# Patient Record
Sex: MALE | Race: WHITE
[De-identification: names, ages, dates, MRNs, and addresses within clinical notes are randomized per-mention and may not be internally consistent; named-entity substitution may affect disease eponyms.]

---

## 2017-07-19 ENCOUNTER — APPOINTMENT (OUTPATIENT)
Dept: PULMONOLOGY | Facility: CLINIC | Age: 57
End: 2017-07-19

## 2017-07-19 VITALS
HEIGHT: 70 IN | DIASTOLIC BLOOD PRESSURE: 70 MMHG | OXYGEN SATURATION: 95 % | WEIGHT: 180 LBS | SYSTOLIC BLOOD PRESSURE: 110 MMHG | TEMPERATURE: 97.8 F | HEART RATE: 71 BPM | BODY MASS INDEX: 25.77 KG/M2

## 2018-07-18 ENCOUNTER — APPOINTMENT (OUTPATIENT)
Dept: PULMONOLOGY | Facility: CLINIC | Age: 58
End: 2018-07-18
Payer: COMMERCIAL

## 2018-07-18 VITALS
HEIGHT: 70 IN | OXYGEN SATURATION: 95 % | HEART RATE: 71 BPM | SYSTOLIC BLOOD PRESSURE: 120 MMHG | WEIGHT: 175 LBS | BODY MASS INDEX: 25.05 KG/M2 | DIASTOLIC BLOOD PRESSURE: 72 MMHG | TEMPERATURE: 97.9 F

## 2018-07-18 PROCEDURE — 71046 X-RAY EXAM CHEST 2 VIEWS: CPT

## 2018-07-18 PROCEDURE — 94010 BREATHING CAPACITY TEST: CPT

## 2018-07-18 PROCEDURE — 99214 OFFICE O/P EST MOD 30 MIN: CPT | Mod: 25

## 2019-03-15 ENCOUNTER — TRANSCRIPTION ENCOUNTER (OUTPATIENT)
Age: 59
End: 2019-03-15

## 2019-06-04 ENCOUNTER — APPOINTMENT (OUTPATIENT)
Dept: PULMONOLOGY | Facility: CLINIC | Age: 59
End: 2019-06-04
Payer: COMMERCIAL

## 2019-06-04 VITALS
TEMPERATURE: 98.9 F | HEIGHT: 70 IN | SYSTOLIC BLOOD PRESSURE: 112 MMHG | DIASTOLIC BLOOD PRESSURE: 70 MMHG | HEART RATE: 78 BPM | OXYGEN SATURATION: 94 %

## 2019-06-04 PROCEDURE — 99214 OFFICE O/P EST MOD 30 MIN: CPT | Mod: 25

## 2019-06-04 PROCEDURE — 94010 BREATHING CAPACITY TEST: CPT

## 2019-06-04 PROCEDURE — 71046 X-RAY EXAM CHEST 2 VIEWS: CPT

## 2019-06-04 RX ORDER — PROMETHAZINE HYDROCHLORIDE AND CODEINE PHOSPHATE 6.25; 1 MG/5ML; MG/5ML
6.25-1 SOLUTION ORAL
Qty: 200 | Refills: 0 | Status: ACTIVE | COMMUNITY
Start: 2019-06-04 | End: 1900-01-01

## 2019-06-05 NOTE — HISTORY OF PRESENT ILLNESS
[FreeTextEntry1] : patient with a history of . otherwise quite well, and has residual inflammatory findings on the chest film for which he comes by for a yearly follow up  he is doing well, aside form mild internmittent coughig  that may be realted to allergies or possible mild viral infection.

## 2019-06-05 NOTE — PHYSICAL EXAM
[General Appearance - Well Developed] : well developed [Normal Appearance] : normal appearance [General Appearance - Well Nourished] : well nourished [Well Groomed] : well groomed [No Deformities] : no deformities [General Appearance - In No Acute Distress] : no acute distress [Normal Conjunctiva] : the conjunctiva exhibited no abnormalities [Eyelids - No Xanthelasma] : the eyelids demonstrated no xanthelasmas [Normal Oropharynx] : normal oropharynx [Neck Cervical Mass (___cm)] : no neck mass was observed [Neck Appearance] : the appearance of the neck was normal [Jugular Venous Distention Increased] : there was no jugular-venous distention [Thyroid Diffuse Enlargement] : the thyroid was not enlarged [Thyroid Nodule] : there were no palpable thyroid nodules [Heart Rate And Rhythm] : heart rate and rhythm were normal [Murmurs] : no murmurs present [Heart Sounds] : normal S1 and S2 [FreeTextEntry1] : ronchi in the left lung in areas of bronchiectasis [Abdomen Soft] : soft [Abdomen Tenderness] : non-tender [Abdomen Mass (___ Cm)] : no abdominal mass palpated [Abnormal Walk] : normal gait [Gait - Sufficient For Exercise Testing] : the gait was sufficient for exercise testing [Nail Clubbing] : no clubbing of the fingernails [Cyanosis, Localized] : no localized cyanosis [Petechial Hemorrhages (___cm)] : no petechial hemorrhages [] : no ischemic changes [Deep Tendon Reflexes (DTR)] : deep tendon reflexes were 2+ and symmetric [Sensation] : the sensory exam was normal to light touch and pinprick [Oriented To Time, Place, And Person] : oriented to person, place, and time [No Focal Deficits] : no focal deficits [Impaired Insight] : insight and judgment were intact [Affect] : the affect was normal

## 2019-06-05 NOTE — PROCEDURE
[FreeTextEntry1] : spirometry is normal\par \par chest film with areas of inflammatory residual without change from the past

## 2019-06-05 NOTE — DISCUSSION/SUMMARY
[FreeTextEntry1] : no evidence for recurrent \par \par yearly follow up is all that is needed.\par \par can try inhaled steroid for cough if it continues.

## 2019-09-06 ENCOUNTER — APPOINTMENT (OUTPATIENT)
Dept: PULMONOLOGY | Facility: CLINIC | Age: 59
End: 2019-09-06
Payer: COMMERCIAL

## 2019-09-06 VITALS
TEMPERATURE: 97 F | DIASTOLIC BLOOD PRESSURE: 70 MMHG | HEIGHT: 70 IN | BODY MASS INDEX: 25.05 KG/M2 | WEIGHT: 175 LBS | OXYGEN SATURATION: 98 % | SYSTOLIC BLOOD PRESSURE: 110 MMHG | HEART RATE: 77 BPM

## 2019-09-06 DIAGNOSIS — J84.116 CRYPTOGENIC ORGANIZING PNEUMONIA: ICD-10-CM

## 2019-09-06 PROCEDURE — 94010 BREATHING CAPACITY TEST: CPT

## 2019-09-06 PROCEDURE — 99214 OFFICE O/P EST MOD 30 MIN: CPT | Mod: 25

## 2019-09-06 PROCEDURE — 71046 X-RAY EXAM CHEST 2 VIEWS: CPT

## 2019-09-07 NOTE — PROCEDURE
[FreeTextEntry1] : spirometry with slight restriction\par \par chest film unchanged inflamatory changes at both bases

## 2019-09-07 NOTE — REASON FOR VISIT
[Follow-Up] : a follow-up visit [FreeTextEntry1] : patient with history of  who has had couging problems ever since he was treated, likely due to residual scarring in his lungs,  now with clear cough that has worsened

## 2019-09-07 NOTE — HISTORY OF PRESENT ILLNESS
[FreeTextEntry1] : healthy man with a chronic cough, left over as a result of his dx years ago with .  sputum is clear, occ discolored no fever or dyspnea

## 2019-09-07 NOTE — PHYSICAL EXAM
[General Appearance - Well Developed] : well developed [Normal Appearance] : normal appearance [Well Groomed] : well groomed [General Appearance - Well Nourished] : well nourished [No Deformities] : no deformities [General Appearance - In No Acute Distress] : no acute distress [Normal Conjunctiva] : the conjunctiva exhibited no abnormalities [Eyelids - No Xanthelasma] : the eyelids demonstrated no xanthelasmas [Normal Oropharynx] : normal oropharynx [Neck Appearance] : the appearance of the neck was normal [Neck Cervical Mass (___cm)] : no neck mass was observed [Jugular Venous Distention Increased] : there was no jugular-venous distention [Thyroid Nodule] : there were no palpable thyroid nodules [Thyroid Diffuse Enlargement] : the thyroid was not enlarged [Heart Rate And Rhythm] : heart rate and rhythm were normal [Heart Sounds] : normal S1 and S2 [Murmurs] : no murmurs present [FreeTextEntry1] : ronchi in the left lung in areas of bronchiectasis [Abdomen Soft] : soft [Abdomen Tenderness] : non-tender [Abdomen Mass (___ Cm)] : no abdominal mass palpated [Abnormal Walk] : normal gait [Gait - Sufficient For Exercise Testing] : the gait was sufficient for exercise testing [Nail Clubbing] : no clubbing of the fingernails [Cyanosis, Localized] : no localized cyanosis [Petechial Hemorrhages (___cm)] : no petechial hemorrhages [] : no ischemic changes [Deep Tendon Reflexes (DTR)] : deep tendon reflexes were 2+ and symmetric [Sensation] : the sensory exam was normal to light touch and pinprick [No Focal Deficits] : no focal deficits [Oriented To Time, Place, And Person] : oriented to person, place, and time [Impaired Insight] : insight and judgment were intact [Affect] : the affect was normal

## 2019-09-07 NOTE — DISCUSSION/SUMMARY
[FreeTextEntry1] : i will get a ct, but likely will need to treat this with pulmonary hygiene mostly.  will need to get ct and see whats there first.  discuss with patient and his concerned wife it detail, with review of present and previous films.

## 2019-09-07 NOTE — REVIEW OF SYSTEMS
[Cough] : cough [Sputum] : sputum  [Dyspnea] : no dyspnea [Chest Tightness] : no chest tightness [Pleuritic Pain] : no pleuritic pain [Negative] : Dermatologic

## 2019-09-11 ENCOUNTER — OTHER (OUTPATIENT)
Age: 59
End: 2019-09-11

## 2019-09-17 LAB — BACTERIA SPT CULT: ABNORMAL

## 2019-10-01 RX ORDER — AMOXICILLIN 500 MG/1
500 TABLET, FILM COATED ORAL 3 TIMES DAILY
Qty: 42 | Refills: 0 | Status: ACTIVE | COMMUNITY
Start: 2019-10-01 | End: 1900-01-01

## 2019-11-04 LAB — ACID FAST STN SPT: NORMAL

## 2019-11-22 LAB — BACTERIA SPT CULT: NORMAL

## 2020-03-14 RX ORDER — HYDROCODONE BITARTRATE AND HOMATROPINE METHYLBROMIDE 5; 1.5 MG/5ML; MG/5ML
5-1.5 SOLUTION ORAL
Qty: 150 | Refills: 0 | Status: ACTIVE | COMMUNITY
Start: 2018-07-18 | End: 1900-01-01

## 2020-07-15 ENCOUNTER — LABORATORY RESULT (OUTPATIENT)
Age: 60
End: 2020-07-15

## 2020-07-17 ENCOUNTER — APPOINTMENT (OUTPATIENT)
Dept: PULMONOLOGY | Facility: CLINIC | Age: 60
End: 2020-07-17
Payer: COMMERCIAL

## 2020-07-17 VITALS
WEIGHT: 184 LBS | HEIGHT: 70 IN | TEMPERATURE: 97.6 F | BODY MASS INDEX: 26.34 KG/M2 | HEART RATE: 62 BPM | DIASTOLIC BLOOD PRESSURE: 65 MMHG | SYSTOLIC BLOOD PRESSURE: 110 MMHG | OXYGEN SATURATION: 96 %

## 2020-07-17 PROCEDURE — 99213 OFFICE O/P EST LOW 20 MIN: CPT

## 2020-07-17 PROCEDURE — 71046 X-RAY EXAM CHEST 2 VIEWS: CPT

## 2020-07-19 NOTE — PROCEDURE
[FreeTextEntry1] : chest film with inflamamtory changes  no other findings in comparison to old films

## 2020-07-19 NOTE — REVIEW OF SYSTEMS
[Dry Eyes] : dry eyes [Eye Irritation] : eye irritation [Sinus Problems] : sinus problems [Cough] : cough [TextBox_30] : scant sputum [Negative] : Musculoskeletal

## 2020-07-19 NOTE — HISTORY OF PRESENT ILLNESS
[TextBox_4] : patient with bronchiectasis,  many years ago suffered from , and was left with a chronic cough  last year had short period of hemoptysis and had a ct that showed no significant pathology other than what we already knew.  he feels well, his cough is chronic and does not bother him.

## 2020-07-19 NOTE — REASON FOR VISIT
[Follow-Up] : a follow-up visit [TextBox_44] : patient with bronchiectasis as a result of  many years ago, here for yearly visit

## 2020-07-19 NOTE — PHYSICAL EXAM
[No Acute Distress] : no acute distress [Normal Oropharynx] : normal oropharynx [No Neck Mass] : no neck mass [Normal Rate/Rhythm] : normal rate/rhythm [Normal Appearance] : normal appearance [Normal S1, S2] : normal s1, s2 [Clear to Auscultation Bilaterally] : clear to auscultation bilaterally [No Resp Distress] : no resp distress [No Murmurs] : no murmurs [No Abnormalities] : no abnormalities [No Clubbing] : no clubbing [Normal Color/ Pigmentation] : normal color/ pigmentation [No Focal Deficits] : no focal deficits [Oriented x3] : oriented x3 [Normal Affect] : normal affect

## 2020-07-24 ENCOUNTER — TRANSCRIPTION ENCOUNTER (OUTPATIENT)
Age: 60
End: 2020-07-24

## 2020-10-22 LAB
SARS-COV-2 IGG SERPL IA-ACNC: 0.08 INDEX
SARS-COV-2 IGG SERPL QL IA: NEGATIVE

## 2020-11-20 ENCOUNTER — TRANSCRIPTION ENCOUNTER (OUTPATIENT)
Age: 60
End: 2020-11-20

## 2020-12-04 ENCOUNTER — TRANSCRIPTION ENCOUNTER (OUTPATIENT)
Age: 60
End: 2020-12-04

## 2021-06-14 ENCOUNTER — APPOINTMENT (OUTPATIENT)
Dept: PULMONOLOGY | Facility: CLINIC | Age: 61
End: 2021-06-14
Payer: COMMERCIAL

## 2021-06-14 VITALS
TEMPERATURE: 97.1 F | HEART RATE: 73 BPM | BODY MASS INDEX: 26.05 KG/M2 | OXYGEN SATURATION: 95 % | HEIGHT: 70 IN | DIASTOLIC BLOOD PRESSURE: 72 MMHG | WEIGHT: 182 LBS | SYSTOLIC BLOOD PRESSURE: 121 MMHG

## 2021-06-14 PROCEDURE — 99072 ADDL SUPL MATRL&STAF TM PHE: CPT

## 2021-06-14 PROCEDURE — 71046 X-RAY EXAM CHEST 2 VIEWS: CPT

## 2021-06-14 PROCEDURE — 99213 OFFICE O/P EST LOW 20 MIN: CPT | Mod: 25

## 2021-06-15 NOTE — PROCEDURE
[FreeTextEntry1] : X-ray chest 2- view performed in-office :\par \par Unchanged from prior. No focal opacities. \par \par Spirometry:\par Normal spirometry , no obstruction or restriction.

## 2021-06-15 NOTE — HISTORY OF PRESENT ILLNESS
[TextBox_4] : 60 year old male with pmh of  with residual bronchiectasis who is here for follow up. Patient continues to have some cough throughout the day. Cough is worse in the morning and is productive of sputum. He does not feel that the cough is bothersome or disruptive to his daily life. Denies any fevers or chills. Has been attending airway clearance teaching sessions with his wife and is interested in trying an airway clearance device.

## 2021-06-15 NOTE — REVIEW OF SYSTEMS
[Cough] : cough [Sputum] : sputum [Negative] : Endocrine [Hemoptysis] : no hemoptysis [Chest Tightness] : no chest tightness [Frequent URIs] : no frequent URIs [Dyspnea] : no dyspnea [Pleuritic Pain] : no pleuritic pain [Wheezing] : no wheezing [A.M. Dry Mouth] : no a.m. dry mouth [SOB on Exertion] : no sob on exertion

## 2021-06-15 NOTE — DISCUSSION/SUMMARY
[FreeTextEntry1] : Patient with stable bronchiectasis. Has not had any significant changes in his X-ray , spirometry or clinical symptoms. \par \par Interested in trying airway clearance , Recommended aerobika. \par \par RTC in 1 year.

## 2022-03-14 ENCOUNTER — APPOINTMENT (OUTPATIENT)
Dept: PULMONOLOGY | Facility: CLINIC | Age: 62
End: 2022-03-14
Payer: COMMERCIAL

## 2022-03-14 VITALS
OXYGEN SATURATION: 96 % | HEIGHT: 70 IN | HEART RATE: 64 BPM | TEMPERATURE: 97.1 F | BODY MASS INDEX: 26.05 KG/M2 | SYSTOLIC BLOOD PRESSURE: 121 MMHG | WEIGHT: 182 LBS | DIASTOLIC BLOOD PRESSURE: 78 MMHG

## 2022-03-14 PROCEDURE — 71046 X-RAY EXAM CHEST 2 VIEWS: CPT

## 2022-03-14 RX ORDER — PREDNISONE 20 MG/1
20 TABLET ORAL
Qty: 10 | Refills: 3 | Status: ACTIVE | COMMUNITY
Start: 2022-03-14 | End: 1900-01-01

## 2022-03-14 RX ORDER — PROMETHAZINE HYDROCHLORIDE AND CODEINE PHOSPHATE 6.25; 1 MG/5ML; MG/5ML
6.25-1 SOLUTION ORAL
Qty: 200 | Refills: 3 | Status: ACTIVE | COMMUNITY
Start: 2022-03-14 | End: 1900-01-01

## 2022-03-15 NOTE — PHYSICAL EXAM
[No Acute Distress] : no acute distress [Normal Oropharynx] : normal oropharynx [Normal Appearance] : normal appearance [No Neck Mass] : no neck mass [Normal Rate/Rhythm] : normal rate/rhythm [Normal S1, S2] : normal s1, s2 [No Murmurs] : no murmurs [No Resp Distress] : no resp distress [No Abnormalities] : no abnormalities [No Clubbing] : no clubbing [Normal Color/ Pigmentation] : normal color/ pigmentation [No Focal Deficits] : no focal deficits [Oriented x3] : oriented x3 [Normal Affect] : normal affect [TextBox_68] : harsh ronchi that clears with coughing

## 2022-03-15 NOTE — HISTORY OF PRESENT ILLNESS
[TextBox_4] : patient with bronchiecatiss as a result of  many year ago,\par \par here for fu that he comes for every year.\par \par doing well, but notes couging and briinging up more sputum

## 2022-03-15 NOTE — DISCUSSION/SUMMARY
[FreeTextEntry1] : place on augmentin and steroids\par \par for exacerbation of bronchiectasis left over from  he suffered from in the past

## 2022-03-15 NOTE — REVIEW OF SYSTEMS
[Dry Eyes] : dry eyes [Eye Irritation] : eye irritation [Sinus Problems] : sinus problems [Cough] : cough [Negative] : Dermatologic [TextBox_30] : scant sputum

## 2022-05-10 ENCOUNTER — NON-APPOINTMENT (OUTPATIENT)
Age: 62
End: 2022-05-10

## 2022-05-13 ENCOUNTER — NON-APPOINTMENT (OUTPATIENT)
Age: 62
End: 2022-05-13

## 2022-05-18 RX ORDER — AMOXICILLIN AND CLAVULANATE POTASSIUM 875; 125 MG/1; MG/1
875-125 TABLET, COATED ORAL
Qty: 20 | Refills: 1 | Status: ACTIVE | COMMUNITY
Start: 2022-03-14 | End: 1900-01-01

## 2022-08-08 ENCOUNTER — NON-APPOINTMENT (OUTPATIENT)
Age: 62
End: 2022-08-08

## 2023-03-14 NOTE — REASON FOR VISIT
[Follow-Up] : a follow-up visit [Bronchiectasis] : bronchiectasis Skyri[facility] Counseling: I discussed with the patient the risks of [id number]-rzaa including but not limited to immunosuppression, and serious infections.  The patient understands that monitoring is required including a PPD at baseline and must alert us or the primary physician if symptoms of infection or other concerning signs are noted.

## 2023-06-16 ENCOUNTER — APPOINTMENT (OUTPATIENT)
Dept: PULMONOLOGY | Facility: CLINIC | Age: 63
End: 2023-06-16
Payer: COMMERCIAL

## 2023-06-16 VITALS
HEIGHT: 70 IN | TEMPERATURE: 97.1 F | SYSTOLIC BLOOD PRESSURE: 121 MMHG | WEIGHT: 190 LBS | DIASTOLIC BLOOD PRESSURE: 76 MMHG | HEART RATE: 68 BPM | OXYGEN SATURATION: 97 % | BODY MASS INDEX: 27.2 KG/M2

## 2023-06-16 PROCEDURE — 71046 X-RAY EXAM CHEST 2 VIEWS: CPT

## 2023-06-16 PROCEDURE — 99214 OFFICE O/P EST MOD 30 MIN: CPT | Mod: 25

## 2023-06-18 NOTE — PROCEDURE
[FreeTextEntry1] : chest film without any change from before\par \par evidence of bronchiectiass in the right lung field at the bases

## 2023-06-18 NOTE — PHYSICAL EXAM
[No Acute Distress] : no acute distress [Normal Oropharynx] : normal oropharynx [Normal Appearance] : normal appearance [Normal Rate/Rhythm] : normal rate/rhythm [No Neck Mass] : no neck mass [Normal S1, S2] : normal s1, s2 [No Murmurs] : no murmurs [No Resp Distress] : no resp distress [No Abnormalities] : no abnormalities [Normal Color/ Pigmentation] : normal color/ pigmentation [No Clubbing] : no clubbing [No Focal Deficits] : no focal deficits [Oriented x3] : oriented x3 [Normal Affect] : normal affect [TextBox_68] : harsh ronchi that clears with coughing

## 2023-06-18 NOTE — HISTORY OF PRESENT ILLNESS
[TextBox_4] : here with bronchictasis as a remnant of \par \par and still occ cough, cvongestion allergies\par \par but usually does well\par \par low pitched rumble in lungs\par \par not change from before\par \par otherewise is enjoying good health\par \par was on copious steroids 18 years ago for this disagnosis of  and he's left with residual scarring.\par \par

## 2023-09-22 ENCOUNTER — NON-APPOINTMENT (OUTPATIENT)
Age: 63
End: 2023-09-22

## 2023-10-23 ENCOUNTER — APPOINTMENT (OUTPATIENT)
Dept: PULMONOLOGY | Facility: CLINIC | Age: 63
End: 2023-10-23

## 2024-01-07 LAB
BACTERIA SPT CULT: NORMAL
BACTERIA SPT CULT: NORMAL

## 2024-06-09 ENCOUNTER — NON-APPOINTMENT (OUTPATIENT)
Age: 64
End: 2024-06-09

## 2024-06-10 ENCOUNTER — APPOINTMENT (OUTPATIENT)
Dept: PULMONOLOGY | Facility: CLINIC | Age: 64
End: 2024-06-10
Payer: MEDICARE

## 2024-06-10 VITALS
DIASTOLIC BLOOD PRESSURE: 70 MMHG | SYSTOLIC BLOOD PRESSURE: 120 MMHG | WEIGHT: 189 LBS | OXYGEN SATURATION: 96 % | HEART RATE: 75 BPM | TEMPERATURE: 98.4 F | HEIGHT: 70 IN | RESPIRATION RATE: 16 BRPM | BODY MASS INDEX: 27.06 KG/M2

## 2024-06-10 DIAGNOSIS — J47.9 BRONCHIECTASIS, UNCOMPLICATED: ICD-10-CM

## 2024-06-10 PROCEDURE — 99204 OFFICE O/P NEW MOD 45 MIN: CPT | Mod: 25

## 2024-06-10 PROCEDURE — 89220 SPUTUM SPECIMEN COLLECTION: CPT

## 2024-06-10 RX ORDER — AMOXICILLIN AND CLAVULANATE POTASSIUM 875; 125 MG/1; MG/1
875-125 TABLET, COATED ORAL TWICE DAILY
Qty: 20 | Refills: 0 | Status: ACTIVE | COMMUNITY
Start: 2024-06-10 | End: 1900-01-01

## 2024-06-10 NOTE — HISTORY OF PRESENT ILLNESS
[Never] : never [TextBox_4] : Mr. Carr presents to clinic today for follow up of bronchiectasis. He developed a bad cough over the weekend it started saturday night. He had a runny nose during the day and some muscle aches. Hasn't checked his temperature. Before this bout he was doing great and had some cough here and there.

## 2024-06-10 NOTE — PHYSICAL EXAM
[No Acute Distress] : no acute distress [Normal Oropharynx] : normal oropharynx [Normal Appearance] : normal appearance [No Neck Mass] : no neck mass [Normal Rate/Rhythm] : normal rate/rhythm [Normal S1, S2] : normal s1, s2 [No Murmurs] : no murmurs [No Resp Distress] : no resp distress [No Abnormalities] : no abnormalities [Benign] : benign [Normal Gait] : normal gait [No Clubbing] : no clubbing [No Cyanosis] : no cyanosis [No Edema] : no edema [FROM] : FROM [Normal Color/ Pigmentation] : normal color/ pigmentation [No Focal Deficits] : no focal deficits [Oriented x3] : oriented x3 [Normal Affect] : normal affect [TextBox_68] : some faint crackles bilaterally at the bases

## 2024-06-10 NOTE — ASSESSMENT
[FreeTextEntry1] : He is overall doing well, getting over an URI. Got a sputum culture in the office today. He has grown normal amelia the last several years. CXR also done in the office which looks normal with some areas of known bronchiectasis. Will prescribe a course of Augmentin. If his symptoms do not improve in that time or if the culture grows a bug that's not covered, we can change his regimen and possibly give steroids for his wheezing if it does not improve. Cough syrup also prescribed as he has trouble sleeping because of the cough.   RTC with Dr. Nguyen in 6 months

## 2024-06-14 RX ORDER — HYDROCODONE BITARTRATE AND HOMATROPINE METHYLBROMIDE 1.5; 5 MG/5ML; MG/5ML
5-1.5 SOLUTION ORAL
Qty: 200 | Refills: 0 | Status: ACTIVE | COMMUNITY
Start: 2024-06-10 | End: 1900-01-01

## 2024-06-19 LAB — BACTERIA SPT CULT: ABNORMAL

## 2025-02-04 ENCOUNTER — APPOINTMENT (OUTPATIENT)
Dept: PULMONOLOGY | Facility: CLINIC | Age: 65
End: 2025-02-04
Payer: MEDICARE

## 2025-02-04 ENCOUNTER — NON-APPOINTMENT (OUTPATIENT)
Age: 65
End: 2025-02-04

## 2025-02-04 VITALS
TEMPERATURE: 97.9 F | HEART RATE: 96 BPM | DIASTOLIC BLOOD PRESSURE: 78 MMHG | SYSTOLIC BLOOD PRESSURE: 125 MMHG | HEIGHT: 70 IN | BODY MASS INDEX: 26.63 KG/M2 | OXYGEN SATURATION: 98 % | WEIGHT: 186 LBS

## 2025-02-04 PROCEDURE — 94010 BREATHING CAPACITY TEST: CPT

## 2025-02-04 PROCEDURE — 99213 OFFICE O/P EST LOW 20 MIN: CPT | Mod: 25

## 2025-02-04 PROCEDURE — 71046 X-RAY EXAM CHEST 2 VIEWS: CPT

## 2025-02-10 ENCOUNTER — NON-APPOINTMENT (OUTPATIENT)
Age: 65
End: 2025-02-10